# Patient Record
Sex: MALE | Race: WHITE | NOT HISPANIC OR LATINO | URBAN - NONMETROPOLITAN AREA
[De-identification: names, ages, dates, MRNs, and addresses within clinical notes are randomized per-mention and may not be internally consistent; named-entity substitution may affect disease eponyms.]

---

## 2018-07-25 ENCOUNTER — OFFICE VISIT (OUTPATIENT)
Dept: URBAN - NONMETROPOLITAN AREA CLINIC 18 | Facility: CLINIC | Age: 83
End: 2018-07-25
Payer: COMMERCIAL

## 2018-07-25 DIAGNOSIS — H52.223 REGULAR ASTIGMATISM, BILATERAL: Primary | ICD-10-CM

## 2018-07-25 DIAGNOSIS — H43.393 OTHER VITREOUS OPACITIES, BILATERAL: ICD-10-CM

## 2018-07-25 DIAGNOSIS — H16.143 PUNCTATE KERATITIS, BILATERAL: ICD-10-CM

## 2018-07-25 DIAGNOSIS — H11.043 PERIPHERAL PTERYGIUM, STATIONARY, BILATERAL: ICD-10-CM

## 2018-07-25 DIAGNOSIS — H35.3131 NONEXUDATIVE AGE-RELATED MACULAR DEGENERATION, BILATERAL, EARLY DRY STAGE: ICD-10-CM

## 2018-07-25 DIAGNOSIS — Z96.1 PRESENCE OF PSEUDOPHAKIA: ICD-10-CM

## 2018-07-25 DIAGNOSIS — H26.492 OTHER SECONDARY CATARACT, LEFT EYE: ICD-10-CM

## 2018-07-25 PROCEDURE — 92015 DETERMINE REFRACTIVE STATE: CPT | Performed by: OPTOMETRIST

## 2018-07-25 PROCEDURE — 92014 COMPRE OPH EXAM EST PT 1/>: CPT | Performed by: OPTOMETRIST

## 2018-07-25 ASSESSMENT — INTRAOCULAR PRESSURE
OD: 12
OS: 12

## 2018-07-25 ASSESSMENT — KERATOMETRY
OD: 44.88
OS: 44.63

## 2018-07-25 ASSESSMENT — VISUAL ACUITY
OS: 20/25
OD: 20/40

## 2018-07-25 NOTE — IMPRESSION/PLAN
Impression: Punctate keratitis, bilateral: H16.143. Plan: Discussed condition with patient in detail. Recommend treatment with Preservative Free Artificial tears QID (Systane or Refresh Brand recommended). RTC if symptoms continue.

## 2018-07-25 NOTE — IMPRESSION/PLAN
Impression: Other secondary cataract, left eye: H26.492. Plan: Monitor, limited symptoms experienced.

## 2018-07-25 NOTE — IMPRESSION/PLAN
Impression: Nonexudative age-related macular degeneration, bilateral, early dry stage: H35.3131. Plan: Discussed condition in detail with patient. Patient knows to return to clinic immediately if any changes in vision are experienced.

## 2019-08-27 ENCOUNTER — OFFICE VISIT (OUTPATIENT)
Dept: URBAN - NONMETROPOLITAN AREA CLINIC 18 | Facility: CLINIC | Age: 84
End: 2019-08-27
Payer: COMMERCIAL

## 2019-08-27 PROCEDURE — 92014 COMPRE OPH EXAM EST PT 1/>: CPT | Performed by: OPTOMETRIST

## 2019-08-27 ASSESSMENT — INTRAOCULAR PRESSURE
OD: 13
OS: 13

## 2019-08-27 NOTE — IMPRESSION/PLAN
Impression: Punctate keratitis, left eye: H16.142. Plan: Discussed condition with patient in detail. Recommend treatment with Preservative Free Artificial tears QID (Systane or Refresh Brand recommended). RTC if symptoms continue.

## 2020-02-26 ENCOUNTER — OFFICE VISIT (OUTPATIENT)
Dept: URBAN - NONMETROPOLITAN AREA CLINIC 18 | Facility: CLINIC | Age: 85
End: 2020-02-26
Payer: COMMERCIAL

## 2020-02-26 DIAGNOSIS — H16.142 PUNCTATE KERATITIS, LEFT EYE: ICD-10-CM

## 2020-02-26 DIAGNOSIS — H02.115 CICATRICIAL ECTROPION OF LEFT LOWER EYELID: Primary | ICD-10-CM

## 2020-02-26 PROCEDURE — 99214 OFFICE O/P EST MOD 30 MIN: CPT | Performed by: OPTOMETRIST

## 2020-02-26 ASSESSMENT — INTRAOCULAR PRESSURE
OS: 15
OD: 15

## 2020-02-26 NOTE — IMPRESSION/PLAN
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 Plan: Discussed status with patient. Recommend Genteal Gel BID OS. Will refer to Dr. Salo Clifford for lid evaluation and discuss surgical options/consideration for tarsorrhaphy.

## 2025-08-25 ENCOUNTER — OFFICE VISIT (OUTPATIENT)
Dept: URBAN - NONMETROPOLITAN AREA CLINIC 18 | Facility: CLINIC | Age: OVER 89
End: 2025-08-25
Payer: COMMERCIAL

## 2025-08-25 DIAGNOSIS — H26.492 OTHER SECONDARY CATARACT, LEFT EYE: Primary | ICD-10-CM

## 2025-08-25 DIAGNOSIS — Z96.1 PRESENCE OF PSEUDOPHAKIA: ICD-10-CM

## 2025-08-25 DIAGNOSIS — H04.123 DRY EYE SYNDROME OF BILATERAL LACRIMAL GLANDS: ICD-10-CM

## 2025-08-25 DIAGNOSIS — H43.813 VITREOUS DEGENERATION, BILATERAL: ICD-10-CM

## 2025-08-25 DIAGNOSIS — H11.043 PERIPHERAL PTERYGIUM, STATIONARY, BILATERAL: ICD-10-CM

## 2025-08-25 PROCEDURE — 99204 OFFICE O/P NEW MOD 45 MIN: CPT | Performed by: OPTOMETRIST

## 2025-08-25 ASSESSMENT — VISUAL ACUITY
OS: 20/60
OD: 20/20

## 2025-08-25 ASSESSMENT — INTRAOCULAR PRESSURE
OD: 14
OS: 14